# Patient Record
Sex: MALE | Race: WHITE | NOT HISPANIC OR LATINO | URBAN - METROPOLITAN AREA
[De-identification: names, ages, dates, MRNs, and addresses within clinical notes are randomized per-mention and may not be internally consistent; named-entity substitution may affect disease eponyms.]

---

## 2017-04-12 ENCOUNTER — APPOINTMENT (RX ONLY)
Dept: URBAN - METROPOLITAN AREA CLINIC 106 | Facility: CLINIC | Age: 57
Setting detail: DERMATOLOGY
End: 2017-04-12

## 2017-04-12 DIAGNOSIS — L82.1 OTHER SEBORRHEIC KERATOSIS: ICD-10-CM

## 2017-04-12 DIAGNOSIS — L57.0 ACTINIC KERATOSIS: ICD-10-CM

## 2017-04-12 PROBLEM — E78.5 HYPERLIPIDEMIA, UNSPECIFIED: Status: ACTIVE | Noted: 2017-04-12

## 2017-04-12 PROBLEM — I10 ESSENTIAL (PRIMARY) HYPERTENSION: Status: ACTIVE | Noted: 2017-04-12

## 2017-04-12 PROCEDURE — ? PREMALIGNANT DESTRUCTION

## 2017-04-12 PROCEDURE — 17000 DESTRUCT PREMALG LESION: CPT

## 2017-04-12 PROCEDURE — 99202 OFFICE O/P NEW SF 15 MIN: CPT | Mod: 25

## 2017-04-12 ASSESSMENT — LOCATION SIMPLE DESCRIPTION DERM
LOCATION SIMPLE: RIGHT INFERIOR EYELID
LOCATION SIMPLE: LEFT CHEEK

## 2017-04-12 ASSESSMENT — LOCATION DETAILED DESCRIPTION DERM
LOCATION DETAILED: RIGHT LATERAL INFERIOR EYELID
LOCATION DETAILED: LEFT INFERIOR LATERAL MALAR CHEEK

## 2017-04-12 ASSESSMENT — LOCATION ZONE DERM
LOCATION ZONE: FACE
LOCATION ZONE: EYELID

## 2017-04-12 NOTE — PROCEDURE: PREMALIGNANT DESTRUCTION
Method: TCA 50%
Post-Care Instructions: I reviewed with the patient in detail post-care instructions. Patient is to wear sunprotection, and avoid picking at any of the treated lesions. Pt may apply Vaseline to crusted or scabbing areas.
Anesthesia Volume In Cc: 1
Consent: The patient's consent was obtained including but not limited to risks of crusting, scabbing, blistering, scarring, darker or lighter pigmentary change, recurrence, incomplete removal and infection.
Detail Level: Simple

## 2017-05-17 ENCOUNTER — APPOINTMENT (RX ONLY)
Dept: URBAN - METROPOLITAN AREA CLINIC 106 | Facility: CLINIC | Age: 57
Setting detail: DERMATOLOGY
End: 2017-05-17

## 2017-05-17 DIAGNOSIS — L57.0 ACTINIC KERATOSIS: ICD-10-CM

## 2017-05-17 PROCEDURE — 99213 OFFICE O/P EST LOW 20 MIN: CPT | Mod: NC

## 2017-05-17 ASSESSMENT — LOCATION ZONE DERM: LOCATION ZONE: EYELID

## 2017-05-17 ASSESSMENT — LOCATION DETAILED DESCRIPTION DERM: LOCATION DETAILED: RIGHT LATERAL INFERIOR EYELID

## 2017-05-17 ASSESSMENT — LOCATION SIMPLE DESCRIPTION DERM: LOCATION SIMPLE: RIGHT INFERIOR EYELID

## 2018-05-23 ENCOUNTER — APPOINTMENT (RX ONLY)
Dept: URBAN - METROPOLITAN AREA CLINIC 106 | Facility: CLINIC | Age: 58
Setting detail: DERMATOLOGY
End: 2018-05-23

## 2018-05-23 DIAGNOSIS — L82.1 OTHER SEBORRHEIC KERATOSIS: ICD-10-CM

## 2018-05-23 DIAGNOSIS — L57.0 ACTINIC KERATOSIS: ICD-10-CM

## 2018-05-23 PROCEDURE — 17000 DESTRUCT PREMALG LESION: CPT

## 2018-05-23 PROCEDURE — 99214 OFFICE O/P EST MOD 30 MIN: CPT | Mod: 25

## 2018-05-23 PROCEDURE — ? PREMALIGNANT DESTRUCTION

## 2018-05-23 ASSESSMENT — LOCATION ZONE DERM
LOCATION ZONE: FACE
LOCATION ZONE: TRUNK

## 2018-05-23 ASSESSMENT — LOCATION DETAILED DESCRIPTION DERM
LOCATION DETAILED: RIGHT INFERIOR UPPER BACK
LOCATION DETAILED: RIGHT CENTRAL MALAR CHEEK

## 2018-05-23 ASSESSMENT — LOCATION SIMPLE DESCRIPTION DERM
LOCATION SIMPLE: RIGHT UPPER BACK
LOCATION SIMPLE: RIGHT CHEEK

## 2018-05-23 NOTE — PROCEDURE: PREMALIGNANT DESTRUCTION
Method: TCA 50%
Detail Level: Simple
Anesthesia Volume In Cc: 1
Post-Care Instructions: I reviewed with the patient in detail post-care instructions. Patient is to wear sunprotection, and avoid picking at any of the treated lesions. Pt may apply Vaseline to crusted or scabbing areas.
Consent: The patient's consent was obtained including but not limited to risks of crusting, scabbing, blistering, scarring, darker or lighter pigmentary change, recurrence, incomplete removal and infection.

## 2019-04-03 ENCOUNTER — APPOINTMENT (RX ONLY)
Dept: URBAN - METROPOLITAN AREA CLINIC 106 | Facility: CLINIC | Age: 59
Setting detail: DERMATOLOGY
End: 2019-04-03

## 2019-04-03 DIAGNOSIS — L57.8 OTHER SKIN CHANGES DUE TO CHRONIC EXPOSURE TO NONIONIZING RADIATION: ICD-10-CM

## 2019-04-03 DIAGNOSIS — Z87.2 PERSONAL HISTORY OF DISEASES OF THE SKIN AND SUBCUTANEOUS TISSUE: ICD-10-CM

## 2019-04-03 DIAGNOSIS — L82.1 OTHER SEBORRHEIC KERATOSIS: ICD-10-CM

## 2019-04-03 DIAGNOSIS — L57.0 ACTINIC KERATOSIS: ICD-10-CM

## 2019-04-03 PROCEDURE — ? PREMALIGNANT DESTRUCTION

## 2019-04-03 PROCEDURE — ? COUNSELING

## 2019-04-03 PROCEDURE — 17003 DESTRUCT PREMALG LES 2-14: CPT

## 2019-04-03 PROCEDURE — 99214 OFFICE O/P EST MOD 30 MIN: CPT | Mod: 25

## 2019-04-03 PROCEDURE — 17000 DESTRUCT PREMALG LESION: CPT

## 2019-04-03 ASSESSMENT — LOCATION SIMPLE DESCRIPTION DERM
LOCATION SIMPLE: RIGHT TEMPLE
LOCATION SIMPLE: LEFT CHEEK
LOCATION SIMPLE: RIGHT CHEEK

## 2019-04-03 ASSESSMENT — LOCATION DETAILED DESCRIPTION DERM
LOCATION DETAILED: RIGHT INFERIOR TEMPLE
LOCATION DETAILED: LEFT SUPERIOR LATERAL BUCCAL CHEEK
LOCATION DETAILED: RIGHT SUPERIOR LATERAL MALAR CHEEK
LOCATION DETAILED: RIGHT MEDIAL MALAR CHEEK

## 2019-04-03 ASSESSMENT — LOCATION ZONE DERM: LOCATION ZONE: FACE

## 2019-04-03 NOTE — PROCEDURE: PREMALIGNANT DESTRUCTION
Consent: The patient's consent was obtained including but not limited to risks of crusting, scabbing, blistering, scarring, darker or lighter pigmentary change, recurrence, incomplete removal and infection.
Detail Level: Simple
Render Post-Care In The Note: No
Post-Care Instructions: I reviewed with the patient in detail post-care instructions. Patient is to wear sunprotection, and avoid picking at any of the treated lesions. Pt may apply Vaseline to crusted or scabbing areas.
Anesthesia Volume In Cc: 0
Method: TCA 50%

## 2020-07-01 ENCOUNTER — APPOINTMENT (RX ONLY)
Dept: URBAN - METROPOLITAN AREA CLINIC 106 | Facility: CLINIC | Age: 60
Setting detail: DERMATOLOGY
End: 2020-07-01

## 2020-07-01 VITALS — TEMPERATURE: 97.7 F

## 2020-07-01 DIAGNOSIS — L57.0 ACTINIC KERATOSIS: ICD-10-CM

## 2020-07-01 DIAGNOSIS — L82.1 OTHER SEBORRHEIC KERATOSIS: ICD-10-CM

## 2020-07-01 DIAGNOSIS — D18.0 HEMANGIOMA: ICD-10-CM

## 2020-07-01 PROBLEM — D18.01 HEMANGIOMA OF SKIN AND SUBCUTANEOUS TISSUE: Status: ACTIVE | Noted: 2020-07-01

## 2020-07-01 PROCEDURE — ? PREMALIGNANT DESTRUCTION

## 2020-07-01 PROCEDURE — ? COUNSELING

## 2020-07-01 PROCEDURE — 17000 DESTRUCT PREMALG LESION: CPT

## 2020-07-01 PROCEDURE — 99214 OFFICE O/P EST MOD 30 MIN: CPT | Mod: 25

## 2020-07-01 PROCEDURE — 17003 DESTRUCT PREMALG LES 2-14: CPT

## 2020-07-01 ASSESSMENT — LOCATION ZONE DERM
LOCATION ZONE: TRUNK
LOCATION ZONE: NECK
LOCATION ZONE: FACE

## 2020-07-01 ASSESSMENT — LOCATION SIMPLE DESCRIPTION DERM
LOCATION SIMPLE: RIGHT ZYGOMA
LOCATION SIMPLE: ABDOMEN
LOCATION SIMPLE: RIGHT ANTERIOR NECK
LOCATION SIMPLE: LEFT CHEEK
LOCATION SIMPLE: RIGHT UPPER BACK

## 2020-07-01 ASSESSMENT — LOCATION DETAILED DESCRIPTION DERM
LOCATION DETAILED: RIGHT MEDIAL ZYGOMA
LOCATION DETAILED: RIGHT CENTRAL ZYGOMA
LOCATION DETAILED: RIGHT MID-UPPER BACK
LOCATION DETAILED: PERIUMBILICAL SKIN
LOCATION DETAILED: LEFT INFERIOR CENTRAL MALAR CHEEK
LOCATION DETAILED: RIGHT CLAVICULAR NECK

## 2020-07-01 NOTE — PROCEDURE: MIPS QUALITY
Noted Dr. Hooker aware as received the following,    \"Shameka Hooker, DO   to Me           11/15/19 2:58 PM    Note reviewed.   Thank you.\"    
Additional Notes: Unaware of medications, strength or dosing. Will bring updated list next visit. Vaccine never offered.
Quality 111:Pneumonia Vaccination Status For Older Adults: Pneumococcal Vaccination not Administered or Previously Received, Reason not Otherwise Specified
Detail Level: Detailed
Quality 130: Documentation Of Current Medications In The Medical Record: Eligible clinician attests to documenting in the medical record the patient is not eligible for a current list of medications being obtained, updated, or reviewed by the eligible clinician

## 2020-07-01 NOTE — PROCEDURE: PREMALIGNANT DESTRUCTION
Post-Care Instructions: I reviewed with the patient in detail post-care instructions. Patient is to wear sunprotection, and avoid picking at any of the treated lesions. Pt may apply Vaseline to crusted or scabbing areas.
Anesthesia Volume In Cc: 0
Render Note In Bullet Format When Appropriate: No
Consent: The patient's consent was obtained including but not limited to risks of crusting, scabbing, blistering, scarring, darker or lighter pigmentary change, recurrence, incomplete removal and infection.
Method: TCA 50%
Detail Level: Simple